# Patient Record
Sex: FEMALE | Race: ASIAN
[De-identification: names, ages, dates, MRNs, and addresses within clinical notes are randomized per-mention and may not be internally consistent; named-entity substitution may affect disease eponyms.]

---

## 2018-05-11 ENCOUNTER — HOSPITAL ENCOUNTER (OUTPATIENT)
Dept: HOSPITAL 25 - OR | Age: 38
Discharge: HOME | End: 2018-05-11
Attending: OBSTETRICS & GYNECOLOGY
Payer: COMMERCIAL

## 2018-05-11 VITALS — SYSTOLIC BLOOD PRESSURE: 109 MMHG | DIASTOLIC BLOOD PRESSURE: 66 MMHG

## 2018-05-11 DIAGNOSIS — D64.9: ICD-10-CM

## 2018-05-11 DIAGNOSIS — N84.0: Primary | ICD-10-CM

## 2018-05-11 DIAGNOSIS — K21.9: ICD-10-CM

## 2018-05-11 LAB
BASOPHILS # BLD AUTO: 0 10^3/UL (ref 0–0.2)
EOSINOPHIL # BLD AUTO: 0.1 10^3/UL (ref 0–0.6)
HCT VFR BLD AUTO: 33 % (ref 35–47)
HGB BLD-MCNC: 11.4 G/DL (ref 12–16)
LYMPHOCYTES # BLD AUTO: 1.7 10^3/UL (ref 1–4.8)
MCH RBC QN AUTO: 31 PG (ref 27–31)
MCHC RBC AUTO-ENTMCNC: 34 G/DL (ref 31–36)
MCV RBC AUTO: 91 FL (ref 80–97)
MONOCYTES # BLD AUTO: 0.2 10^3/UL (ref 0–0.8)
NEUTROPHILS # BLD AUTO: 1.5 10^3/UL (ref 1.5–7.7)
NRBC # BLD AUTO: 0 10^3/UL
NRBC BLD QL AUTO: 0.1
PLATELET # BLD AUTO: 266 10^3/UL (ref 150–450)
RBC # BLD AUTO: 3.67 10^6/UL (ref 4–5.4)
WBC # BLD AUTO: 3.6 10^3/UL (ref 3.5–10.8)

## 2018-05-11 PROCEDURE — 81025 URINE PREGNANCY TEST: CPT

## 2018-05-11 PROCEDURE — 88305 TISSUE EXAM BY PATHOLOGIST: CPT

## 2018-05-11 PROCEDURE — 36415 COLL VENOUS BLD VENIPUNCTURE: CPT

## 2018-05-11 PROCEDURE — 85025 COMPLETE CBC W/AUTO DIFF WBC: CPT

## 2018-05-12 NOTE — OP
OPERATIVE REPORT:

 

DATE OF OPERATION:  18

 

DATE OF BIRTH:  [birth year]

 

SURGEON:  Galindo Godinez MD

 

ANESTHESIOLOGIST:  Dr. Sandy.

 

ANESTHESIA:  Spinal.

 

PRE-OP DIAGNOSIS:  Endometrial polyp.

 

POST-OP DIAGNOSIS:  Endometrial polyp.

 

OPERATIVE PROCEDURE:  Hysteroscopy, dilation and curettage, and MyoSure 
polypectomy.

 

ESTIMATED BLOOD LOSS:  Minimal.

 

URINE OUTPUT:  300 cc.

 

IV FLUIDS:  550 cc lactated Ringer's.

 

MATERIALS TO LAB:  Endometrial curettings and polyp fragments.

 

INDICATIONS:  This patient is a 38-year-old  1, para 1, who presented 
with finding of a likely polyp on ultrasound.  She was counseled and consented 
for polypectomy in the operating room.

 

FINDINGS:  Some thickened endometrium and areas that appeared consistent with 
an endometrial polyp.  No other abnormalities were seen.

 

COMPLICATIONS:  None.

 

DESCRIPTION OF PROCEDURE:  The risks, benefits, and alternatives were described 
with the patient and informed consent was obtained.  The patient was taken to 
the operating room with IV running where spinal anesthesia was induced and 
found to be adequate.  The patient was prepped and draped in the normal sterile 
fashion in the high lithotomy position in [facility].  A time-out was 
performed.  The bladder was emptied.  A bivalved speculum was placed in the 
vagina and a single-tooth tenaculum was placed on the anterior cervix.  The 
cervix was then gently dilated using Hegar and Hanks dilators to a size 27.  At 
that time, a MyoSure hysteroscope was advanced through the cervix and into the 
uterine cavity with saline running. Fluid was managed with an Aquilex fluid 
management system.  The findings were as noted above.  A MyoSure Lite device 
was then prepared and placed through the scope.  All of the polypoid fragments 
were resected using the MyoSure without difficulty.  The hysteroscope was then 
removed.  A curettage of the endometrial cavity was performed using a medium 
Banjo curette with the curettings collected on Telfa.

 

The tenaculum was then removed from the cervix and there was good hemostasis 
present.  The speculum was removed and the patient was returned to the supine 
position.

 

The patient tolerated the procedure well.  Sponge, lap, and needle counts were 
correct x2.

 

 [id number]/CPS #: [id number]

[facility]